# Patient Record
Sex: MALE | Race: WHITE | NOT HISPANIC OR LATINO | Employment: FULL TIME | URBAN - METROPOLITAN AREA
[De-identification: names, ages, dates, MRNs, and addresses within clinical notes are randomized per-mention and may not be internally consistent; named-entity substitution may affect disease eponyms.]

---

## 2023-02-01 ENCOUNTER — HOSPITAL ENCOUNTER (EMERGENCY)
Facility: CLINIC | Age: 63
Discharge: HOME | End: 2023-02-01
Attending: FAMILY MEDICINE | Admitting: FAMILY MEDICINE
Payer: COMMERCIAL

## 2023-02-01 ENCOUNTER — APPOINTMENT (OUTPATIENT)
Dept: CT IMAGING | Facility: CLINIC | Age: 63
End: 2023-02-01
Attending: EMERGENCY MEDICINE
Payer: COMMERCIAL

## 2023-02-01 VITALS
OXYGEN SATURATION: 98 % | TEMPERATURE: 98.3 F | HEIGHT: 65 IN | RESPIRATION RATE: 14 BRPM | HEART RATE: 74 BPM | DIASTOLIC BLOOD PRESSURE: 93 MMHG | BODY MASS INDEX: 30.99 KG/M2 | SYSTOLIC BLOOD PRESSURE: 183 MMHG | WEIGHT: 186 LBS

## 2023-02-01 DIAGNOSIS — H54.3 VISION LOSS, BILATERAL: ICD-10-CM

## 2023-02-01 DIAGNOSIS — Q21.12 PFO (PATENT FORAMEN OVALE): ICD-10-CM

## 2023-02-01 DIAGNOSIS — Z86.73 H/O: CVA (CEREBROVASCULAR ACCIDENT): ICD-10-CM

## 2023-02-01 PROBLEM — M67.40 GANGLION CYST: Status: ACTIVE | Noted: 2023-02-01

## 2023-02-01 PROBLEM — R00.2 PALPITATIONS: Status: ACTIVE | Noted: 2022-08-12

## 2023-02-01 PROBLEM — I10 HYPERTENSION: Status: ACTIVE | Noted: 2023-02-01

## 2023-02-01 LAB
ANION GAP SERPL CALCULATED.3IONS-SCNC: 9 MMOL/L (ref 5–18)
APTT PPP: 27 SECONDS (ref 22–38)
ATRIAL RATE - MUSE: 61 BPM
BASOPHILS # BLD AUTO: 0 10E3/UL (ref 0–0.2)
BASOPHILS NFR BLD AUTO: 1 %
BUN SERPL-MCNC: 19 MG/DL (ref 8–22)
CALCIUM SERPL-MCNC: 9 MG/DL (ref 8.5–10.5)
CHLORIDE BLD-SCNC: 103 MMOL/L (ref 98–107)
CO2 SERPL-SCNC: 26 MMOL/L (ref 22–31)
CREAT SERPL-MCNC: 1.21 MG/DL (ref 0.7–1.3)
DIASTOLIC BLOOD PRESSURE - MUSE: NORMAL MMHG
EOSINOPHIL # BLD AUTO: 0.2 10E3/UL (ref 0–0.7)
EOSINOPHIL NFR BLD AUTO: 4 %
ERYTHROCYTE [DISTWIDTH] IN BLOOD BY AUTOMATED COUNT: 12.7 % (ref 10–15)
GFR SERPL CREATININE-BSD FRML MDRD: 68 ML/MIN/1.73M2
GLUCOSE BLD-MCNC: 91 MG/DL (ref 70–125)
GLUCOSE BLDC GLUCOMTR-MCNC: 101 MG/DL (ref 70–99)
HCT VFR BLD AUTO: 41.8 % (ref 40–53)
HGB BLD-MCNC: 14.2 G/DL (ref 13.3–17.7)
IMM GRANULOCYTES # BLD: 0 10E3/UL
IMM GRANULOCYTES NFR BLD: 0 %
INR PPP: 0.92 (ref 0.85–1.15)
INTERPRETATION ECG - MUSE: NORMAL
LYMPHOCYTES # BLD AUTO: 2.1 10E3/UL (ref 0.8–5.3)
LYMPHOCYTES NFR BLD AUTO: 32 %
MCH RBC QN AUTO: 30.3 PG (ref 26.5–33)
MCHC RBC AUTO-ENTMCNC: 34 G/DL (ref 31.5–36.5)
MCV RBC AUTO: 89 FL (ref 78–100)
MONOCYTES # BLD AUTO: 0.7 10E3/UL (ref 0–1.3)
MONOCYTES NFR BLD AUTO: 10 %
NEUTROPHILS # BLD AUTO: 3.6 10E3/UL (ref 1.6–8.3)
NEUTROPHILS NFR BLD AUTO: 53 %
NRBC # BLD AUTO: 0 10E3/UL
NRBC BLD AUTO-RTO: 0 /100
P AXIS - MUSE: 32 DEGREES
PLATELET # BLD AUTO: 172 10E3/UL (ref 150–450)
POTASSIUM BLD-SCNC: 4.1 MMOL/L (ref 3.5–5)
PR INTERVAL - MUSE: 138 MS
QRS DURATION - MUSE: 90 MS
QT - MUSE: 416 MS
QTC - MUSE: 418 MS
R AXIS - MUSE: 6 DEGREES
RBC # BLD AUTO: 4.68 10E6/UL (ref 4.4–5.9)
SODIUM SERPL-SCNC: 138 MMOL/L (ref 136–145)
SYSTOLIC BLOOD PRESSURE - MUSE: NORMAL MMHG
T AXIS - MUSE: 33 DEGREES
TROPONIN I SERPL-MCNC: 0.02 NG/ML (ref 0–0.29)
VENTRICULAR RATE- MUSE: 61 BPM
WBC # BLD AUTO: 6.6 10E3/UL (ref 4–11)

## 2023-02-01 PROCEDURE — 70498 CT ANGIOGRAPHY NECK: CPT

## 2023-02-01 PROCEDURE — 70496 CT ANGIOGRAPHY HEAD: CPT

## 2023-02-01 PROCEDURE — 80048 BASIC METABOLIC PNL TOTAL CA: CPT | Performed by: EMERGENCY MEDICINE

## 2023-02-01 PROCEDURE — 250N000011 HC RX IP 250 OP 636: Performed by: FAMILY MEDICINE

## 2023-02-01 PROCEDURE — 85610 PROTHROMBIN TIME: CPT | Performed by: EMERGENCY MEDICINE

## 2023-02-01 PROCEDURE — 93005 ELECTROCARDIOGRAM TRACING: CPT | Performed by: EMERGENCY MEDICINE

## 2023-02-01 PROCEDURE — 99285 EMERGENCY DEPT VISIT HI MDM: CPT | Mod: 25 | Performed by: FAMILY MEDICINE

## 2023-02-01 PROCEDURE — 84484 ASSAY OF TROPONIN QUANT: CPT | Performed by: EMERGENCY MEDICINE

## 2023-02-01 PROCEDURE — 85025 COMPLETE CBC W/AUTO DIFF WBC: CPT | Performed by: EMERGENCY MEDICINE

## 2023-02-01 PROCEDURE — 36415 COLL VENOUS BLD VENIPUNCTURE: CPT | Performed by: EMERGENCY MEDICINE

## 2023-02-01 PROCEDURE — 85730 THROMBOPLASTIN TIME PARTIAL: CPT | Performed by: EMERGENCY MEDICINE

## 2023-02-01 PROCEDURE — 82962 GLUCOSE BLOOD TEST: CPT

## 2023-02-01 PROCEDURE — 99207 PR NO BILLABLE SERVICE THIS VISIT: CPT | Performed by: NURSE PRACTITIONER

## 2023-02-01 RX ORDER — IOPAMIDOL 755 MG/ML
100 INJECTION, SOLUTION INTRAVASCULAR ONCE
Status: COMPLETED | OUTPATIENT
Start: 2023-02-01 | End: 2023-02-01

## 2023-02-01 RX ADMIN — IOPAMIDOL 75 ML: 755 INJECTION, SOLUTION INTRAVENOUS at 12:14

## 2023-02-01 ASSESSMENT — ACTIVITIES OF DAILY LIVING (ADL)
ADLS_ACUITY_SCORE: 35

## 2023-02-01 NOTE — ED NOTES
EMERGENCY DEPARTMENT SIGN OUT NOTE        BRIEF HISTORY  Patient was signed out to me by Dr. Alfonzo Quintanilla at 3:07 PM.    Shaq Pacheco is a 62 year old male who presented for evaluation of vision changes.  He has a history of PFO as well as previous cerebellar stroke.  Today, he had described peripheral vision changes.  Initially, he underwent CT/CTA which was unremarkable.  Neurology recommended proceeding with MRI and this is pending at signout.  Plan is to follow-up on these results and discussed the case with neurology.      LAB  Pertinent labs results reviewed in Epic.    RADIOLOGY    Pertinent imaging reviewed. Please see official radiology report.      ED COURSE  Patient was signed out to me by Dr. Alfonzo Quintanilla at 3:07 PM.  7:30 PM I evaluated the patient and updated him on MRI wait times.  8:57 PM I reevaluated the patient. Patient states he is claustrophobic and is refusing MR imaging.      MEDICAL DECISION MAKING  62 year old male who presented for evaluation of vision changes.  Unfortunately, there was a fairly significant delay in getting an MRI due to number of patients in the department.  Ultimately, patient did go over for imaging but was unable to tolerate the scan due to claustrophobia.  I reevaluated him multiple times and explained the necessity of obtaining an MRI to rule out cerebellar stroke.  He verbalized understanding.  He reported that the vision changes he presented with completely resolved and he stated he would rather go home, understanding of the risks in doing so.  He does have a relationship with a neurologist and states that he would like to call that provider in the morning to arrange follow-up.  In the meantime, I recommended he continue to take all of his medications as prescribed and return to the emergency department immediately if he has any recurrence of symptoms or other concerns.  He also has an appointment with his primary care provider 2 days from now and I  encouraged him to keep this as well.  Both he and his son were comfortable with this plan for disposition.    The importance of close follow up was discussed. I instructed Mr. Pacheco to follow-up with his primary care provider. We reviewed warning signs and symptoms, and I instructed Mr. Pacheco to return to the emergency department immediately if he develops any new or worsening symptoms. I provided additional verbal discharge instructions. Mr. Pacheco expressed understanding and agreement with this plan of care, his questions were answered, and he was discharged in stable condition.     FINAL IMPRESSION    1. PFO (patent foramen ovale)    2. Vision loss, bilateral    3. H/O: CVA (cerebrovascular accident)    4. Vision changes        I, Wilian Chavez, am serving as a scribe to document services personally performed by Dr. Ev Riggins, based on my observations and the provider's statements to me.  I, Ev Riggins MD, attest that Wilian Chavez is acting in a scribe capacity, has observed my performance of the services and has documented them in accordance with my direction.      Ev Riggins MD  Emergency Medicine  [facility]        Ev Riggins MD  02/01/23 3600

## 2023-02-01 NOTE — CONSULTS
"  [facility]    Stroke Telephone Note    I was called by Alfonzo Quintanilla on 02/01/23 regarding patient Shaq Pacheco. The patient is a 62 year old male with PMH of HTN, tobacco use, Linq device in place for palpitations, prior cerebellar infarct, PFO; recent cardiology notes support closure of PFO given history of stroke without other clear etiology. He presents to the ED for visual disturbance. LKW was around 1130 at which time he developed visual disturbance; this is described as binocular \"black spots in peripheral vision\" and feeling \"faces are blurred\" when he looks at them. Per ED provider no clear hemianopsia/visual deficits or other neurological deficits on exam. Patient reports symptoms are improved to nearly resolved. Presenting /91. On ASA alone, no anticoagulation.     Stroke Code Data (for stroke code without tele)  Stroke code activated 02/01/23   1151   Stroke provider first response  02/01/23   1154            Last known normal 02/01/23   1130        Time of discovery   (or onset of symptoms) 02/01/23   1130   Head CT read by Stroke Neuro Dr/Provider 02/01/23   1200   Was stroke code de-escalated? Yes 02/01/23 1214          Imaging Findings   CT head:no hemorrhage or other acute findings; chronic infarct of L cerebellar hemisphere  CTA head/neck: No LVO, significant stenosis or dissection     Intravenous Thrombolysis  Not given due to:   - minor/isolated/quickly resolving symptoms    Endovascular Treatment  Not initiated due to absence of proximal vessel occlusion    Impression  1. Bilateral peripheral and central visual disturbance, now resolved. Consider TIA/stroke given history of stroke + PFO not yet closed vs other    Recommendations   -brain MRI with and without contrast; please page stroke neurology if infarct is identified for further recommendations  -discussed with vascular neurology attending, Dr. Ruiz     My recommendations are based on the " "information provided over the phone by Shaq Pacheco's in-person providers. They are not intended to replace the clinical judgment of his in-person providers. I was not requested to personally see or examine the patient at this time.    Jillian URENA, CNP  Vascular Neurology  To page me or covering stroke neurology team member, click here: AMCOM   Choose \"On Call\" tab at top, then search dropdown box for \"Neurology Adult\", select location, press Enter, then look for stroke/neuro ICU/telestroke.    "

## 2023-02-01 NOTE — ED PROVIDER NOTES
"EMERGENCY DEPARTMENT ENCOUNTER      NAME: Shaq Pacheco  AGE: 62 year old male  YOB: 1960  MRN: [id number]  EVALUATION DATE & TIME: No admission date for patient encounter.    PCP: No primary care provider on file.    ED PROVIDER: Alfonzo Quintanilla M.D.    Chief Complaint   Patient presents with     Loss of Vision       FINAL IMPRESSION:  1. PFO (patent foramen ovale)    2. Vision loss, bilateral    3. H/O: CVA (cerebrovascular accident)        ED COURSE & MEDICAL DECISION MAKING:    Pertinent Labs & Imaging studies independently interpreted by me. (See chart for details)  11:50 AM Patient seen and examined, outpatient and emergency department records reviewed show history of prior cerebellar stroke with echocardiogram last year showing patent foramen ovale, has followed with cardiology and currently has a Linq recorder, in discussion with cardiology for foramen ovale repair.  Presents today with vision changes.  This started about 11:30 AM, he describes it as abnormal vision in the periphery and \"not able to see faces.\"  Symptoms are improved but not completely resolved at this time.  No focal findings on exam, NIH stroke scale is 0 with no peripheral field deficits found.  Given history of prior strokes, high risk with PFO, and symptoms, tier 1 stroke code initiated but patient is not likely a lytic candidate.  11:55 AM care discussed with stroke neurology, agrees that with improving symptoms, lytics not indicated at this time.  Will follow along.  12:16 PM Stroke neurology call-back, no acute findings on CT or CTA.  De-escalate stroke code, recommend MRI brain with and without given high risk.  12:24 PM  Discussed CT result with radiologist, no acute findings on CT or CTA, old left cerebellar infarct.  12:28 PM Rechecked and updated patient. Patient's vision completely back to normal.  1:43 PM labs independently interpreted by me demonstrate normal troponin, reassuring basic panel, CBC " "within normal limits.  Waiting for MRI.  3:09 PM signout to oncoming provider to follow-up on MRI.    At the conclusion of the encounter I discussed the results of all of the tests and the disposition. The questions were answered. The patient or family acknowledged understanding and was agreeable with the care plan.     Medical Decision Making    History:    Supplemental history from: Family Member/Significant Other    External Record(s) reviewed: Outpatient Record:      Work Up:    Chart documentation includes differential considered and any EKGs or imaging independently interpreted by provider, where specified.    In additional to work up documented, I considered the following work up: Documented in chart, if applicable.    External consultation:    Discussion of management with another provider: Documented in chart, if applicable    Complicating factors:    Care impacted by chronic illness: Hypertension    Care affected by social determinants of health: N/A    Disposition considerations: Admission considered. Patient was signed out to the oncoming physician, disposition pending.    PROCEDURES:       MEDICATIONS GIVEN IN THE EMERGENCY:  Medications   iopamidol (ISOVUE-370) solution 100 mL (75 mLs Intravenous Given 2/1/23 1214)       NEW PRESCRIPTIONS STARTED AT TODAY'S ER VISIT  New Prescriptions    No medications on file       =================================================================    HPI    Patient information was obtained from: patient, patient's wife      Shaq Pacheco is a 62 year old male with a pertinent history of CVA, TIA, HTN, and patent foramen ovale who presents to this ED by walk in escorted by spouse for evaluation of visual disturbance.    About 20 minute prior to arrival in the ED (11:30 AM), patient was out at lunch when suddenly he lost his vision and was unable to see faces if he was looking straight. Patient states \"I had to keep moving my head to see people's faces.\" His " "vision loss has since improved. The vision loss was bilateral and mostly peripheral. He denies a headache. Patient took ibuprofen right away.    Patient experienced a CVA 8 years ago with very similar symptoms.    Patient takes a baby aspirin every day. He is not anticoagulated.    Per patient's wife, patient has a \"hole in his heart\" and an \"implant that is recording his heart\". Patient saw his PCP recently to see if they need to close the hole up.    Patient states he is allergic to sulfa drugs.    REVIEW OF SYSTEMS   Review of Systems   All other systems reviewed and negative    PAST MEDICAL HISTORY:  Past Medical History:   Diagnosis Date     Hypertension        PAST SURGICAL HISTORY:  History reviewed. No pertinent surgical history.    CURRENT MEDICATIONS:    No current facility-administered medications for this encounter.     Current Outpatient Medications   Medication     aspirin 81 MG EC tablet     cyanocobalamin 1000 MCG tablet     hydroCHLOROthiazide (HYDRODIURIL) 50 MG tablet     propranoloL (INDERAL LA) 160 mg SR capsule     psyllium (METAMUCIL FIBER SINGLES) 3.4 gram packet       ALLERGIES:  Allergies   Allergen Reactions     Sulfa (Sulfonamide Antibiotics) [Sulfa Drugs] Unknown     Hives       FAMILY HISTORY:  History reviewed. No pertinent family history.    SOCIAL HISTORY:   Social History     Socioeconomic History     Marital status:        VITALS:  BP (!) 141/81   Pulse 52   Temp 98.3  F (36.8  C) (Oral)   Resp 14   Wt 84.4 kg (186 lb)   SpO2 97%   BMI 30.02 kg/m      PHYSICAL EXAM:  Physical Exam  Vitals and nursing note reviewed.   Constitutional:       Appearance: Normal appearance.   HENT:      Head: Normocephalic and atraumatic.      Right Ear: External ear normal.      Left Ear: External ear normal.      Nose: Nose normal.      Mouth/Throat:      Mouth: Mucous membranes are moist.   Eyes:      Extraocular Movements: Extraocular movements intact.      Conjunctiva/sclera: " Conjunctivae normal.      Pupils: Pupils are equal, round, and reactive to light.   Cardiovascular:      Rate and Rhythm: Normal rate and regular rhythm.   Pulmonary:      Effort: Pulmonary effort is normal.      Breath sounds: Normal breath sounds. No wheezing or rales.   Abdominal:      General: Abdomen is flat. There is no distension.      Palpations: Abdomen is soft.      Tenderness: There is no abdominal tenderness. There is no guarding.   Musculoskeletal:         General: Normal range of motion.      Cervical back: Normal range of motion and neck supple.      Right lower leg: No edema.      Left lower leg: No edema.   Lymphadenopathy:      Cervical: No cervical adenopathy.   Skin:     General: Skin is warm and dry.   Neurological:      General: No focal deficit present.      Mental Status: He is alert and oriented to person, place, and time. Mental status is at baseline.      Comments: No gross focal neurologic deficits   Psychiatric:         Mood and Affect: Mood normal.         Behavior: Behavior normal.         Thought Content: Thought content normal.     National Institutes of Health Stroke Scale  Exam Interval: Baseline   Score    Level of consciousness: (0)   Alert, keenly responsive    LOC questions: (0)   Answers both questions correctly    LOC commands: (0)   Performs both tasks correctly    Best gaze: (0)   Normal    Visual: (0)   No visual loss    Facial palsy: (0)   Normal symmetrical movements    Motor arm (left): (0)   No drift    Motor arm (right): (0)   No drift    Motor leg (left): (0)   No drift    Motor leg (right): (0)   No drift    Limb ataxia: (0)   Absent    Sensory: (0)   Normal- no sensory loss    Best language: (0)   Normal- no aphasia    Dysarthria: (0)   Normal    Extinction and inattention: (0)   No abnormality        Total Score:  0        LAB:  All pertinent labs reviewed and interpreted.  Results for orders placed or performed during the hospital encounter of 02/01/23   CTA Head  Neck with Contrast    Impression    IMPRESSION:   HEAD CT:  1.  No acute intracranial process.  2.  Chronic infarct of the inferior left cerebellar hemisphere.    HEAD CTA:   1.  Normal CTA [facility] [facility].    NECK CTA:  1.  Normal neck CTA.    2.  Dr. Cantu discussed the above findings by telephone with Dr. Quintanilla at 12:24 PM 02/01/2023.   Basic metabolic panel   Result Value Ref Range    Sodium 138 136 - 145 mmol/L    Potassium 4.1 3.5 - 5.0 mmol/L    Chloride 103 98 - 107 mmol/L    Carbon Dioxide (CO2) 26 22 - 31 mmol/L    Anion Gap 9 5 - 18 mmol/L    Urea Nitrogen 19 8 - 22 mg/dL    Creatinine 1.21 0.70 - 1.30 mg/dL    Calcium 9.0 8.5 - 10.5 mg/dL    Glucose 91 70 - 125 mg/dL    GFR Estimate 68 >60 mL/min/1.73m2   Result Value Ref Range    INR 0.92 0.85 - 1.15   Partial thromboplastin time   Result Value Ref Range    aPTT 27 22 - 38 Seconds   Result Value Ref Range    Troponin I 0.02 0.00 - 0.29 ng/mL   Glucose by meter   Result Value Ref Range    GLUCOSE BY METER POCT 101 (H) 70 - 99 mg/dL   CBC with platelets and differential   Result Value Ref Range    WBC Count 6.6 4.0 - 11.0 10e3/uL    RBC Count 4.68 4.40 - 5.90 10e6/uL    Hemoglobin 14.2 13.3 - 17.7 g/dL    Hematocrit 41.8 40.0 - 53.0 %    MCV 89 78 - 100 fL    MCH 30.3 26.5 - 33.0 pg    MCHC 34.0 31.5 - 36.5 g/dL    RDW 12.7 10.0 - 15.0 %    Platelet Count 172 150 - 450 10e3/uL    % Neutrophils 53 %    % Lymphocytes 32 %    % Monocytes 10 %    % Eosinophils 4 %    % Basophils 1 %    % Immature Granulocytes 0 %    NRBCs per 100 WBC 0 <1 /100    Absolute Neutrophils 3.6 1.6 - 8.3 10e3/uL    Absolute Lymphocytes 2.1 0.8 - 5.3 10e3/uL    Absolute Monocytes 0.7 0.0 - 1.3 10e3/uL    Absolute Eosinophils 0.2 0.0 - 0.7 10e3/uL    Absolute Basophils 0.0 0.0 - 0.2 10e3/uL    Absolute Immature Granulocytes 0.0 <=0.4 10e3/uL    Absolute NRBCs 0.0 10e3/uL       RADIOLOGY:  Reviewed all pertinent imaging. Please see official radiology report.  CTA Head Neck with  Contrast   Final Result   IMPRESSION:    HEAD CT:   1.  No acute intracranial process.   2.  Chronic infarct of the inferior left cerebellar hemisphere.      HEAD CTA:    1.  Normal CTA [facility].      NECK CTA:   1.  Normal neck CTA.      2.  Dr. Cantu discussed the above findings by telephone with Dr. Quintanilla at 12:24 PM 02/01/2023.      MR Brain w/o & w Contrast    (Results Pending)       EKG:    Performed at: 12:12 PM  Impression: Normal EKG  Rate: 61  Rhythm: Sinus  Axis: Normal  PA Interval: 138  QRS Interval: 90  QTc Interval: 418  ST Changes: No acute ischemic change  Comparison: February 2021, no acute changes    I have independently reviewed and interpreted the EKG(s) documented above.    I, Tiana Dobbs, am serving as a scribe to document services personally performed by Dr. Quintanilla based on my observation and the provider's statements to me. I, Alfonzo Quintanilla MD attest that Tiana Dobbs is acting in a scribe capacity, has observed my performance of the services and has documented them in accordance with my direction.    Alfonzo Quintanilla M.D.  Emergency Medicine  [facility] EMERGENCY ROOM  [address] [id number] [phone]  Dept: [phone]     Alfonzo Quintanilla MD  02/01/23 6759

## 2023-02-01 NOTE — ED NOTES
Patient denies visual changes at this time. Patient told RN that his wife called him and told him that his cardiac device is not MRI compatible. Patient requesting to speak to MD. MD notified.  Shaq Pérez RN  2/1/2023  3:28 PM

## 2023-02-01 NOTE — ED TRIAGE NOTES
Pt presents to the ED with c/o bilateral vision loss that happened 20 minutes PTA. Pt endorses hx of stroke 8 years ago, which presented with loss of vision. Takes an 81mg aspirin daily. Pt endorses that the vision is improving. Provider called to triage, tier 1 stroke code initiated.

## 2023-02-02 NOTE — DISCHARGE INSTRUCTIONS
You were seen in the Emergency Department today for vision loss/changes.      You should continue to take all your medications as prescribed and call your neurologist tomorrow to schedule follow-up.  Also keep your appointment with your primary doctor for Friday.      Please return to the ER if you experience recurrent vision change or loss, numbness, weakness, tingling, and/or for any other new or concerning symptoms, otherwise please follow up with your primary doctor and neurologist for recheck.     Thank you for choosing [facility]. It was a pleasure taking care of you today!  - Dr. Ev Riggins

## 2023-03-03 DIAGNOSIS — I10 HTN (HYPERTENSION): ICD-10-CM

## 2023-03-03 DIAGNOSIS — I63.9 STROKE (H): Primary | ICD-10-CM

## 2023-03-03 DIAGNOSIS — I63.9 CEREBROVASCULAR ACCIDENT (H): ICD-10-CM

## 2023-03-03 DIAGNOSIS — G45.9 TIA (TRANSIENT ISCHEMIC ATTACK): ICD-10-CM

## 2023-03-03 DIAGNOSIS — I10 ESSENTIAL HYPERTENSION, MALIGNANT: ICD-10-CM

## 2023-03-03 DIAGNOSIS — F17.200 TOBACCO USE DISORDER: Primary | ICD-10-CM

## 2023-03-03 DIAGNOSIS — E78.5 HYPERLIPEMIA: ICD-10-CM

## 2023-03-03 DIAGNOSIS — F17.200 SMOKER: ICD-10-CM

## 2023-03-03 DIAGNOSIS — Q21.12 PATENT FORAMEN OVALE: ICD-10-CM

## 2023-03-08 ENCOUNTER — LAB (OUTPATIENT)
Dept: LAB | Facility: CLINIC | Age: 63
End: 2023-03-08
Payer: COMMERCIAL

## 2023-03-08 DIAGNOSIS — I10 HTN (HYPERTENSION): ICD-10-CM

## 2023-03-08 DIAGNOSIS — G45.9 TIA (TRANSIENT ISCHEMIC ATTACK): ICD-10-CM

## 2023-03-08 DIAGNOSIS — Q21.12 PATENT FORAMEN OVALE: ICD-10-CM

## 2023-03-08 DIAGNOSIS — F17.200 SMOKER: ICD-10-CM

## 2023-03-08 DIAGNOSIS — I63.9 STROKE (H): ICD-10-CM

## 2023-03-08 DIAGNOSIS — E78.5 HYPERLIPEMIA: ICD-10-CM

## 2023-03-08 LAB — PA ADP BLD-ACNC: 70 PRU

## 2023-03-08 PROCEDURE — 36415 COLL VENOUS BLD VENIPUNCTURE: CPT

## 2023-03-08 PROCEDURE — 85576 BLOOD PLATELET AGGREGATION: CPT
